# Patient Record
Sex: MALE | Race: WHITE | NOT HISPANIC OR LATINO
[De-identification: names, ages, dates, MRNs, and addresses within clinical notes are randomized per-mention and may not be internally consistent; named-entity substitution may affect disease eponyms.]

---

## 2017-06-29 ENCOUNTER — APPOINTMENT (OUTPATIENT)
Dept: SURGICAL ONCOLOGY | Facility: CLINIC | Age: 77
End: 2017-06-29

## 2017-06-29 DIAGNOSIS — Z86.73 PERSONAL HISTORY OF TRANSIENT ISCHEMIC ATTACK (TIA), AND CEREBRAL INFARCTION W/OUT RESIDUAL DEFICITS: ICD-10-CM

## 2017-06-29 DIAGNOSIS — Z78.9 OTHER SPECIFIED HEALTH STATUS: ICD-10-CM

## 2017-06-29 RX ORDER — CLOPIDOGREL BISULFATE 75 MG/1
75 TABLET, FILM COATED ORAL
Refills: 0 | Status: ACTIVE | COMMUNITY

## 2017-06-29 RX ORDER — ATORVASTATIN CALCIUM 40 MG/1
40 TABLET, FILM COATED ORAL
Refills: 0 | Status: ACTIVE | COMMUNITY

## 2017-06-29 RX ORDER — ESCITALOPRAM OXALATE 5 MG/1
5 TABLET ORAL
Refills: 0 | Status: ACTIVE | COMMUNITY

## 2017-06-29 RX ORDER — TAMSULOSIN HYDROCHLORIDE 0.4 MG/1
0.4 CAPSULE ORAL
Refills: 0 | Status: ACTIVE | COMMUNITY

## 2017-06-29 RX ORDER — FAMOTIDINE 20 MG/1
20 TABLET, FILM COATED ORAL
Refills: 0 | Status: ACTIVE | COMMUNITY

## 2017-06-29 RX ORDER — CHLORHEXIDINE GLUCONATE 4 %
325 (65 FE) LIQUID (ML) TOPICAL
Refills: 0 | Status: ACTIVE | COMMUNITY

## 2017-06-29 RX ORDER — FUROSEMIDE 20 MG/1
20 TABLET ORAL
Refills: 0 | Status: ACTIVE | COMMUNITY

## 2017-07-05 ENCOUNTER — RESULT REVIEW (OUTPATIENT)
Age: 77
End: 2017-07-05

## 2017-07-05 ENCOUNTER — OUTPATIENT (OUTPATIENT)
Dept: OUTPATIENT SERVICES | Facility: HOSPITAL | Age: 77
LOS: 1 days | End: 2017-07-05
Payer: MEDICARE

## 2017-07-05 DIAGNOSIS — C43.9 MALIGNANT MELANOMA OF SKIN, UNSPECIFIED: ICD-10-CM

## 2017-07-05 PROCEDURE — 88321 CONSLTJ&REPRT SLD PREP ELSWR: CPT

## 2017-07-13 ENCOUNTER — FORM ENCOUNTER (OUTPATIENT)
Age: 77
End: 2017-07-13

## 2017-07-14 ENCOUNTER — OUTPATIENT (OUTPATIENT)
Dept: OUTPATIENT SERVICES | Facility: HOSPITAL | Age: 77
LOS: 1 days | End: 2017-07-14
Payer: MEDICARE

## 2017-07-14 VITALS
HEART RATE: 59 BPM | RESPIRATION RATE: 16 BRPM | OXYGEN SATURATION: 99 % | SYSTOLIC BLOOD PRESSURE: 130 MMHG | HEIGHT: 68 IN | TEMPERATURE: 97 F | WEIGHT: 149.91 LBS | DIASTOLIC BLOOD PRESSURE: 80 MMHG

## 2017-07-14 DIAGNOSIS — Z85.820 PERSONAL HISTORY OF MALIGNANT MELANOMA OF SKIN: Chronic | ICD-10-CM

## 2017-07-14 DIAGNOSIS — I63.9 CEREBRAL INFARCTION, UNSPECIFIED: ICD-10-CM

## 2017-07-14 DIAGNOSIS — N20.0 CALCULUS OF KIDNEY: Chronic | ICD-10-CM

## 2017-07-14 DIAGNOSIS — T41.45XA ADVERSE EFFECT OF UNSPECIFIED ANESTHETIC, INITIAL ENCOUNTER: ICD-10-CM

## 2017-07-14 DIAGNOSIS — Z85.46 PERSONAL HISTORY OF MALIGNANT NEOPLASM OF PROSTATE: Chronic | ICD-10-CM

## 2017-07-14 DIAGNOSIS — Z98.890 OTHER SPECIFIED POSTPROCEDURAL STATES: Chronic | ICD-10-CM

## 2017-07-14 DIAGNOSIS — E11.9 TYPE 2 DIABETES MELLITUS WITHOUT COMPLICATIONS: ICD-10-CM

## 2017-07-14 DIAGNOSIS — Z90.5 ACQUIRED ABSENCE OF KIDNEY: Chronic | ICD-10-CM

## 2017-07-14 DIAGNOSIS — D03.59 MELANOMA IN SITU OF OTHER PART OF TRUNK: ICD-10-CM

## 2017-07-14 DIAGNOSIS — J98.9 RESPIRATORY DISORDER, UNSPECIFIED: ICD-10-CM

## 2017-07-14 DIAGNOSIS — Z95.9 PRESENCE OF CARDIAC AND VASCULAR IMPLANT AND GRAFT, UNSPECIFIED: ICD-10-CM

## 2017-07-14 DIAGNOSIS — C44.91 BASAL CELL CARCINOMA OF SKIN, UNSPECIFIED: Chronic | ICD-10-CM

## 2017-07-14 LAB
BLD GP AB SCN SERPL QL: NEGATIVE — SIGNIFICANT CHANGE UP
HBA1C BLD-MCNC: 5.8 % — HIGH (ref 4–5.6)
HCT VFR BLD CALC: 43.6 % — SIGNIFICANT CHANGE UP (ref 39–50)
HGB BLD-MCNC: 14.2 G/DL — SIGNIFICANT CHANGE UP (ref 13–17)
LDH SERPL L TO P-CCNC: 141 U/L — SIGNIFICANT CHANGE UP (ref 135–225)
MCHC RBC-ENTMCNC: 31.8 PG — SIGNIFICANT CHANGE UP (ref 27–34)
MCHC RBC-ENTMCNC: 32.6 % — SIGNIFICANT CHANGE UP (ref 32–36)
MCV RBC AUTO: 97.8 FL — SIGNIFICANT CHANGE UP (ref 80–100)
NRBC # FLD: 0 — SIGNIFICANT CHANGE UP
PLATELET # BLD AUTO: 212 K/UL — SIGNIFICANT CHANGE UP (ref 150–400)
PMV BLD: 10.2 FL — SIGNIFICANT CHANGE UP (ref 7–13)
RBC # BLD: 4.46 M/UL — SIGNIFICANT CHANGE UP (ref 4.2–5.8)
RBC # FLD: 12.2 % — SIGNIFICANT CHANGE UP (ref 10.3–14.5)
RH IG SCN BLD-IMP: POSITIVE — SIGNIFICANT CHANGE UP
WBC # BLD: 4.6 K/UL — SIGNIFICANT CHANGE UP (ref 3.8–10.5)
WBC # FLD AUTO: 4.6 K/UL — SIGNIFICANT CHANGE UP (ref 3.8–10.5)

## 2017-07-14 PROCEDURE — 71020: CPT | Mod: 26

## 2017-07-14 PROCEDURE — 93010 ELECTROCARDIOGRAM REPORT: CPT

## 2017-07-14 RX ORDER — SODIUM CHLORIDE 9 MG/ML
1000 INJECTION, SOLUTION INTRAVENOUS
Qty: 0 | Refills: 0 | Status: DISCONTINUED | OUTPATIENT
Start: 2017-08-01 | End: 2017-08-01

## 2017-07-14 NOTE — H&P PST ADULT - ANESTHESIA, PREVIOUS REACTION, PROFILE
As per wife in 2016 in [facility] - had to stay intubated 18 hrs after kidney stone surgery. Denies family Hx of malignant hyperthermia/respiratory complications

## 2017-07-14 NOTE — H&P PST ADULT - RS GEN PE MLT RESP DETAILS PC
respirations non-labored/clear to auscultation bilaterally/no rales/no rhonchi/no intercostal retractions/breath sounds equal/no wheezes/no chest wall tenderness/airway patent/good air movement/no subcutaneous emphysema

## 2017-07-14 NOTE — H&P PST ADULT - FAMILY HISTORY
Sibling  Still living? Yes, Estimated age: Age Unknown  Family history of melanoma, Age at diagnosis: Age Unknown  Family history of prostate cancer, Age at diagnosis: Age Unknown     Father  Still living? No  Family history of malignant melanoma, Age at diagnosis: Age Unknown

## 2017-07-14 NOTE — H&P PST ADULT - PROBLEM SELECTOR PLAN 5
Loop recorder placed in 2014. Wife states it was for tachycardia, it has resolved, but the device has stayed in place. Does not have a card. Discussed with Dr Orantes anesthesiologist. OR booking made aware.

## 2017-07-14 NOTE — H&P PST ADULT - PROBLEM SELECTOR PLAN 2
Etiology unknown as per wife. Uses oxygen at night. SALCEDO. Instructed to obtain pulmonary clearance.

## 2017-07-14 NOTE — H&P PST ADULT - PROBLEM SELECTOR PLAN 6
As per wife in 2016 in [facility] - had to stay intubated 18 hrs after kidney stone surgery. Discussed with Dr Orantes anesthesiology. Pending pulmonary clearance

## 2017-07-14 NOTE — H&P PST ADULT - PMH
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

## 2017-07-14 NOTE — H&P PST ADULT - NEGATIVE GENERAL SYMPTOMS
no anorexia/no polyuria/no weight gain/no fever/no malaise/no polydipsia/no chills/no polyphagia/no sweating

## 2017-07-14 NOTE — H&P PST ADULT - SOURCE OF INFORMATION, PROFILE
wife. Pt is a poor historian and hx of CVA and dementia/patient patient/wife. Pt is a poor historian and hx of CVA and is forgetful

## 2017-07-14 NOTE — H&P PST ADULT - PROBLEM SELECTOR PLAN 3
As per Dr Busch to continue plavix and aspirin, spoke to [place] surgical coordinator, patient and wife instructed. Pending cardiac clearance with last stress and echo. Has appt with cardiologist already set up.

## 2017-07-14 NOTE — H&P PST ADULT - FALL HARM RISK
coagulation(Bleeding disorder R/T clinical cond/anti-coags) coagulation(Bleeding disorder R/T clinical cond/anti-coags)/surgery/other/advanced age

## 2017-07-14 NOTE — H&P PST ADULT - PSH
Basal cell carcinoma  right forearm  H/O partial nephrectomy    H/O prostate cancer  s/p Cyberknife in [place]  History of loop recorder  11/2014  History of malignant melanoma  wide excision in 5/2013, Hx of multiple melanoma excisions  Kidney stone  2016

## 2017-07-14 NOTE — H&P PST ADULT - HISTORY OF PRESENT ILLNESS
76 year old male presents to presurgical testing with diagnosis of melanoma in situ, scheduled for wide excision of melanoma to left back for 8/1/17. Pt reports the lesion was diagnosed during routine examination. Pt is s/p wide excision of melanoma in situ in 5/2013.

## 2017-07-14 NOTE — H&P PST ADULT - ATTENDING COMMENTS
76 year old man with a melanoma in situ of his RODRICK back, scheduled for w.e. by me.    D/W Pt and wife in my office, and this morning.    All questions answered.    Consent on chart

## 2017-07-14 NOTE — H&P PST ADULT - NEGATIVE GENERAL GENITOURINARY SYMPTOMS
normal urinary frequency/no flank pain L/no flank pain R/no bladder infections/no urinary hesitancy/no hematuria/no incontinence/no renal colic no hematuria/no bladder infections/no renal colic/no flank pain R/normal urinary frequency/no flank pain L/no urinary hesitancy

## 2017-07-14 NOTE — H&P PST ADULT - NEUROLOGICAL COMMENTS
CVA multiple last in 2015, on plavix and aspirin. Hx of dementia, occasional forgetfullness CVA multiple last in 2015, on plavix and aspirin. Hx of dementia, occasional forgetfulness

## 2017-07-14 NOTE — H&P PST ADULT - NEGATIVE ENMT SYMPTOMS
no throat pain/no nasal congestion/no nasal discharge/no vertigo/no tinnitus/no ear pain/no nasal obstruction/no post-nasal discharge/no sinus symptoms/no dysphagia

## 2017-07-14 NOTE — H&P PST ADULT - NEGATIVE OPHTHALMOLOGIC SYMPTOMS
no discharge L/no loss of vision R/no blurred vision L/no diplopia/no loss of vision L/no photophobia/no blurred vision R/no pain R/no pain L/no discharge R

## 2017-07-14 NOTE — H&P PST ADULT - PROBLEM SELECTOR PLAN 1
Pt is scheduled for wide excision of melanoma to left back for 8/1/17. Preop instructions provided to patient and wife. Stated understanding. Will take own famotidine for GI prophylaxis. KOLBY precaution, OR booking notified

## 2017-07-14 NOTE — H&P PST ADULT - GASTROINTESTINAL DETAILS
no distention/no masses palpable/no organomegaly/soft/no rigidity/bowel sounds normal/no bruit/nontender/no guarding/no rebound tenderness

## 2017-07-14 NOTE — H&P PST ADULT - NEGATIVE CARDIOVASCULAR SYMPTOMS
no chest pain/no claudication/no peripheral edema/no orthopnea/no paroxysmal nocturnal dyspnea/no palpitations

## 2017-07-14 NOTE — H&P PST ADULT - MUSCULOSKELETAL
details… detailed exam decreased ROM/no calf tenderness/no joint warmth/no joint erythema/no joint swelling/diminished strength

## 2017-07-31 NOTE — ASU PATIENT PROFILE, ADULT - PSH
Basal cell carcinoma  right forearm  H/O partial nephrectomy    H/O prostate cancer  s/p Cyberknife in [place]  History of loop recorder  11/2014  History of malignant melanoma  wide excision in 5/2013, Hx of multiple melanoma excisions  Kidney stone  2016

## 2017-07-31 NOTE — ASU PATIENT PROFILE, ADULT - ANESTHESIA, PREVIOUS REACTION, PROFILE
As per wife in 2016 in [facility] - had to stay intubated 18 hrs after kidney stone surgery. Denies family Hx of malignant hyperthermia/respiratory complications

## 2017-08-01 ENCOUNTER — APPOINTMENT (OUTPATIENT)
Dept: SURGICAL ONCOLOGY | Facility: HOSPITAL | Age: 77
End: 2017-08-01

## 2017-08-01 ENCOUNTER — OUTPATIENT (OUTPATIENT)
Dept: OUTPATIENT SERVICES | Facility: HOSPITAL | Age: 77
LOS: 1 days | Discharge: HOME | End: 2017-08-01
Payer: MEDICARE

## 2017-08-01 ENCOUNTER — RESULT REVIEW (OUTPATIENT)
Age: 77
End: 2017-08-01

## 2017-08-01 VITALS
OXYGEN SATURATION: 94 % | RESPIRATION RATE: 15 BRPM | HEART RATE: 90 BPM | SYSTOLIC BLOOD PRESSURE: 128 MMHG | DIASTOLIC BLOOD PRESSURE: 69 MMHG | TEMPERATURE: 97 F

## 2017-08-01 VITALS
WEIGHT: 149.91 LBS | HEART RATE: 64 BPM | OXYGEN SATURATION: 96 % | HEIGHT: 68 IN | SYSTOLIC BLOOD PRESSURE: 141 MMHG | DIASTOLIC BLOOD PRESSURE: 74 MMHG | TEMPERATURE: 98 F | RESPIRATION RATE: 15 BRPM

## 2017-08-01 DIAGNOSIS — C44.91 BASAL CELL CARCINOMA OF SKIN, UNSPECIFIED: Chronic | ICD-10-CM

## 2017-08-01 DIAGNOSIS — Z85.820 PERSONAL HISTORY OF MALIGNANT MELANOMA OF SKIN: Chronic | ICD-10-CM

## 2017-08-01 DIAGNOSIS — D03.59 MELANOMA IN SITU OF OTHER PART OF TRUNK: ICD-10-CM

## 2017-08-01 DIAGNOSIS — Z98.890 OTHER SPECIFIED POSTPROCEDURAL STATES: Chronic | ICD-10-CM

## 2017-08-01 DIAGNOSIS — Z85.46 PERSONAL HISTORY OF MALIGNANT NEOPLASM OF PROSTATE: Chronic | ICD-10-CM

## 2017-08-01 DIAGNOSIS — Z90.5 ACQUIRED ABSENCE OF KIDNEY: Chronic | ICD-10-CM

## 2017-08-01 DIAGNOSIS — N20.0 CALCULUS OF KIDNEY: Chronic | ICD-10-CM

## 2017-08-01 LAB
GAS PNL BLDV: 140 MMOL/L — SIGNIFICANT CHANGE UP (ref 136–146)
GLUCOSE BLDV-MCNC: 92 — SIGNIFICANT CHANGE UP (ref 70–99)
POTASSIUM BLDV-SCNC: 3.9 MMOL/L — SIGNIFICANT CHANGE UP (ref 3.4–4.5)

## 2017-08-01 PROCEDURE — 88305 TISSUE EXAM BY PATHOLOGIST: CPT | Mod: 26

## 2017-08-01 PROCEDURE — 14301 TIS TRNFR ANY 30.1-60 SQ CM: CPT

## 2017-08-01 PROCEDURE — 11606 EXC TR-EXT MAL+MARG >4 CM: CPT | Mod: 59

## 2017-08-01 RX ORDER — ATORVASTATIN CALCIUM 80 MG/1
1 TABLET, FILM COATED ORAL
Qty: 0 | Refills: 0 | COMMUNITY

## 2017-08-01 RX ORDER — MEMANTINE HYDROCHLORIDE 10 MG/1
0 TABLET ORAL
Qty: 0 | Refills: 0 | COMMUNITY

## 2017-08-01 RX ORDER — FERROUS SULFATE 325(65) MG
1 TABLET ORAL
Qty: 0 | Refills: 0 | COMMUNITY

## 2017-08-01 RX ORDER — FOLIC ACID 0.8 MG
1 TABLET ORAL
Qty: 0 | Refills: 0 | COMMUNITY

## 2017-08-01 RX ORDER — ESCITALOPRAM OXALATE 10 MG/1
0 TABLET, FILM COATED ORAL
Qty: 0 | Refills: 0 | COMMUNITY

## 2017-08-01 RX ORDER — CLOPIDOGREL BISULFATE 75 MG/1
1 TABLET, FILM COATED ORAL
Qty: 0 | Refills: 0 | COMMUNITY

## 2017-08-01 RX ORDER — TAMSULOSIN HYDROCHLORIDE 0.4 MG/1
1 CAPSULE ORAL
Qty: 0 | Refills: 0 | COMMUNITY

## 2017-08-01 RX ORDER — DOCUSATE SODIUM 100 MG
1 CAPSULE ORAL
Qty: 0 | Refills: 0 | COMMUNITY

## 2017-08-01 RX ORDER — LANOLIN ALCOHOL/MO/W.PET/CERES
1 CREAM (GRAM) TOPICAL
Qty: 0 | Refills: 0 | COMMUNITY

## 2017-08-01 RX ORDER — FAMOTIDINE 10 MG/ML
0 INJECTION INTRAVENOUS
Qty: 0 | Refills: 0 | COMMUNITY

## 2017-08-01 RX ORDER — CARBIDOPA AND LEVODOPA 25; 100 MG/1; MG/1
0 TABLET ORAL
Qty: 0 | Refills: 0 | COMMUNITY

## 2017-08-01 RX ORDER — ASPIRIN/CALCIUM CARB/MAGNESIUM 324 MG
1 TABLET ORAL
Qty: 0 | Refills: 0 | COMMUNITY

## 2017-08-01 RX ORDER — CHOLECALCIFEROL (VITAMIN D3) 125 MCG
1 CAPSULE ORAL
Qty: 0 | Refills: 0 | COMMUNITY

## 2017-08-01 RX ORDER — FUROSEMIDE 40 MG
1 TABLET ORAL
Qty: 0 | Refills: 0 | COMMUNITY

## 2017-08-01 NOTE — PROGRESS NOTE ADULT - ASSESSMENT
A/P:  76M PMHx prior melanoma s/p wide local excision who presents with melanoma in situ for wide local excision of the lesion on his L upper back. Plan to proceed to with procedure in operating room.  -wide local excision (>2cm margins) today  -anticipate disposition home afterward. A/P:  76M PMHx prior melanoma s/p wide local excision who presents with melanoma in situ for wide local excision of the lesion on his L upper back. Plan to proceed to with procedure in operating room.  -wide local excision (>2cm margins) today in operating theater.  -anticipate disposition home afterward. A/P:  76M PMHx prior melanoma s/p wide local excision who presents with melanoma in situ for wide local excision of the lesion on his L upper back. Plan to proceed to with procedure in operating room.  -wide local excision (>2cm margins) today OR  -anticipate disposition home afterward.

## 2017-08-01 NOTE — PROGRESS NOTE ADULT - SUBJECTIVE AND OBJECTIVE BOX
Surgery Pre-operative Note  8/1/17    76M PMHx melanoma s/p wide excision, HTN, CVA, DM, HLD, prostate cancer s/p resection, renal cell carcinoma who presents with melanoma in situ on pathology of a biopsy from early July from his L upper back. He presents for elective wide local excision of the lesion.    VS:    PE:        Labs/pathology:  7/5/17 biopsy  Final Diagnosis  Skin, left upper back, shave biopsy  - Melanoma in situ, at least, with changes of regression.  See note.    Note: Sections demonstrate at least melanoma in situ because  there are regressive changes, suggesting a possible  previous invasive component, which is not seen in the current  biopsy.    A/P:  76M PMHx prior melanoma s/p wide local excision who presents with melanoma in situ for wide local excision of the lesion on his L upper back. Surgery Pre-operative Note  8/1/17    76M PMHx melanoma s/p wide excision, HTN, CVA, DM, HLD, prostate cancer s/p resection, renal cell carcinoma who presents with melanoma in situ on pathology of a biopsy from early July from his L upper back. He presents for elective wide local excision of the lesion.    Home Rx:   Patient Currently Takes Medications as of 14-Jul-2017 14:49 documented in Prescription Writer  · 	ferrous sulfate 325 mg (65 mg elemental iron) oral delayed release tablet: 1 tab(s) orally once a day  · 	carbidopa-levodopa 25 mg-100 mg oral tablet, extended release:  orally 2 times a day 8am and 2pm  · 	tamsulosin 0.4 mg oral capsule: 1 cap(s) orally once a day8pm  · 	atorvastatin 40 mg oral tablet: 1 tab(s) orally once a day 8pm  · 	escitalopram 5 mg oral tablet:  orally once a day 8am  · 	melatonin 5 mg oral capsule: 1 cap(s) orally once a day (at bedtime)  	last dose 8/1/17  · 	docusate sodium 100 mg oral tablet: 1 tab(s) orally 2 times a day  · 	folic acid 1 mg oral tablet: 1 tab(s) orally once a day  · 	aspirin 81 mg oral tablet: 1 tab(s) orally once a day  · 	furosemide 20 mg oral tablet: 1 tab(s) orally once a day  · 	clopidogrel 75 mg oral tablet: 1 tab(s) orally once a day in am  · 	Vitamin D3 2000 intl units oral tablet: 1 tab(s) orally once a day  · 	famotidine 20 mg oral tablet:  orally once a day  · 	memantine 10 mg oral tablet:  orally once a day      VS:      PE:        Labs/pathology:  7/5/17 biopsy  Final Diagnosis  Skin, left upper back, shave biopsy  - Melanoma in situ, at least, with changes of regression.  See note.    Note: Sections demonstrate at least melanoma in situ because  there are regressive changes, suggesting a possible  previous invasive component, which is not seen in the current  biopsy.    A/P:  76M PMHx prior melanoma s/p wide local excision who presents with melanoma in situ for wide local excision of the lesion on his L upper back. Surgery Pre-operative Note  8/1/17    76M PMHx dementia, melanoma s/p wide excision, HTN, CVA, DM, HLD, prostate cancer s/p resection, renal cell carcinoma who presents with melanoma in situ on pathology of a biopsy from early July from his L upper back. He presents for elective wide local excision of the on his back.      VS:  Vital Signs Last 24 Hrs  T(C): 36.4 (01 Aug 2017 09:26), Max: 36.4 (01 Aug 2017 09:26)  T(F): 97.6 (01 Aug 2017 09:26), Max: 97.6 (01 Aug 2017 09:26)  HR: 64 (01 Aug 2017 09:26) (64 - 64)  BP: 141/74 (01 Aug 2017 09:26) (141/74 - 141/74)  BP(mean): --  RR: 15 (01 Aug 2017 09:26) (15 - 15)  SpO2: 96% (01 Aug 2017 09:26) (96% - 96%)    PE:  Gen: elderly, NAD, sitting in chair; wife at side  Pulm: breathing comfortably on RA  Cor: regular  Abd: soft, NT, ND  Back: skin w/ multiple pigmented lesions      Home Rx:   Patient Currently Takes Medications as of 14-Jul-2017 14:49 documented in Prescription Writer  · 	ferrous sulfate 325 mg (65 mg elemental iron) oral delayed release tablet: 1 tab(s) orally once a day  · 	carbidopa-levodopa 25 mg-100 mg oral tablet, extended release:  orally 2 times a day 8am and 2pm  · 	tamsulosin 0.4 mg oral capsule: 1 cap(s) orally once a day8pm  · 	atorvastatin 40 mg oral tablet: 1 tab(s) orally once a day 8pm  · 	escitalopram 5 mg oral tablet:  orally once a day 8am  · 	melatonin 5 mg oral capsule: 1 cap(s) orally once a day (at bedtime)  	last dose 8/1/17  · 	docusate sodium 100 mg oral tablet: 1 tab(s) orally 2 times a day  · 	folic acid 1 mg oral tablet: 1 tab(s) orally once a day  · 	aspirin 81 mg oral tablet: 1 tab(s) orally once a day  · 	furosemide 20 mg oral tablet: 1 tab(s) orally once a day  · 	clopidogrel 75 mg oral tablet: 1 tab(s) orally once a day in am  · 	Vitamin D3 2000 intl units oral tablet: 1 tab(s) orally once a day  · 	famotidine 20 mg oral tablet:  orally once a day  · 	memantine 10 mg oral tablet:  orally once a day        Labs/pathology:  7/5/17 biopsy  Final Diagnosis  Skin, left upper back, shave biopsy  - Melanoma in situ, at least, with changes of regression.  See note.    Note: Sections demonstrate at least melanoma in situ because  there are regressive changes, suggesting a possible  previous invasive component, which is not seen in the current  biopsy. Surgery Pre-operative Note  8/1/17      76M PMHx dementia, melanoma s/p wide excision (2013), HTN, CVA, DM, HLD, prostate cancer s/p resection, renal cell carcinoma who presents with melanoma in situ on pathology of a biopsy from early July from his L upper back. He presents for elective wide local excision of the on his back.      VS:  Vital Signs Last 24 Hrs  T(C): 36.4 (01 Aug 2017 09:26), Max: 36.4 (01 Aug 2017 09:26)  T(F): 97.6 (01 Aug 2017 09:26), Max: 97.6 (01 Aug 2017 09:26)  HR: 64 (01 Aug 2017 09:26) (64 - 64)  BP: 141/74 (01 Aug 2017 09:26) (141/74 - 141/74)  BP(mean): --  RR: 15 (01 Aug 2017 09:26) (15 - 15)  SpO2: 96% (01 Aug 2017 09:26) (96% - 96%)      PE:  Gen: elderly, NAD, sitting in chair; wife at side  Pulm: breathing comfortably on RA  Cor: regular  Abd: soft, NT, ND  Skin: multiple pigmented lesions      Home Rx:   Patient Currently Takes Medications as of 14-Jul-2017 14:49 documented in Prescription Writer  · 	ferrous sulfate 325 mg (65 mg elemental iron) oral delayed release tablet: 1 tab(s) orally once a day  · 	carbidopa-levodopa 25 mg-100 mg oral tablet, extended release:  orally 2 times a day 8am and 2pm  · 	tamsulosin 0.4 mg oral capsule: 1 cap(s) orally once a day8pm  · 	atorvastatin 40 mg oral tablet: 1 tab(s) orally once a day 8pm  · 	escitalopram 5 mg oral tablet:  orally once a day 8am  · 	melatonin 5 mg oral capsule: 1 cap(s) orally once a day (at bedtime)  	last dose 8/1/17  · 	docusate sodium 100 mg oral tablet: 1 tab(s) orally 2 times a day  · 	folic acid 1 mg oral tablet: 1 tab(s) orally once a day  · 	aspirin 81 mg oral tablet: 1 tab(s) orally once a day  · 	furosemide 20 mg oral tablet: 1 tab(s) orally once a day  · 	clopidogrel 75 mg oral tablet: 1 tab(s) orally once a day in am  · 	Vitamin D3 2000 intl units oral tablet: 1 tab(s) orally once a day  · 	famotidine 20 mg oral tablet:  orally once a day  · 	memantine 10 mg oral tablet:  orally once a day        Labs/pathology:  CBC (7/14/17): 4.6>14.2/43.6<212      7/5/17 biopsy  Final Diagnosis  Skin, left upper back, shave biopsy  - Melanoma in situ, at least, with changes of regression.  See note.    Note: Sections demonstrate at least melanoma in situ because  there are regressive changes, suggesting a possible  previous invasive component, which is not seen in the current  biopsy.

## 2017-08-01 NOTE — ASU DISCHARGE PLAN (ADULT/PEDIATRIC). - NOTIFY
Unable to Urinate/Bleeding that does not stop/Persistent Nausea and Vomiting/Numbness, tingling/Increased Irritability or Sluggishness/Fever greater than 101/Numbness, color, or temperature change to extremity/Excessive Diarrhea/Inability to Tolerate Liquids or Foods/Swelling that continues/Pain not relieved by Medications

## 2017-08-03 ENCOUNTER — TRANSCRIPTION ENCOUNTER (OUTPATIENT)
Age: 77
End: 2017-08-03

## 2017-08-04 ENCOUNTER — EMERGENCY (EMERGENCY)
Facility: HOSPITAL | Age: 77
LOS: 1 days | Discharge: HOME | End: 2017-08-04
Attending: EMERGENCY MEDICINE | Admitting: EMERGENCY MEDICINE
Payer: MEDICARE

## 2017-08-04 VITALS
TEMPERATURE: 97 F | WEIGHT: 149.91 LBS | SYSTOLIC BLOOD PRESSURE: 159 MMHG | OXYGEN SATURATION: 95 % | RESPIRATION RATE: 18 BRPM | DIASTOLIC BLOOD PRESSURE: 96 MMHG | HEART RATE: 69 BPM

## 2017-08-04 DIAGNOSIS — Z85.46 PERSONAL HISTORY OF MALIGNANT NEOPLASM OF PROSTATE: Chronic | ICD-10-CM

## 2017-08-04 DIAGNOSIS — N20.0 CALCULUS OF KIDNEY: Chronic | ICD-10-CM

## 2017-08-04 DIAGNOSIS — Z90.5 ACQUIRED ABSENCE OF KIDNEY: Chronic | ICD-10-CM

## 2017-08-04 DIAGNOSIS — Z85.820 PERSONAL HISTORY OF MALIGNANT MELANOMA OF SKIN: Chronic | ICD-10-CM

## 2017-08-04 DIAGNOSIS — Z98.890 OTHER SPECIFIED POSTPROCEDURAL STATES: Chronic | ICD-10-CM

## 2017-08-04 DIAGNOSIS — C44.91 BASAL CELL CARCINOMA OF SKIN, UNSPECIFIED: Chronic | ICD-10-CM

## 2017-08-04 PROCEDURE — G0463: CPT

## 2017-08-04 NOTE — ED PROVIDER NOTE - PSH
Basal cell carcinoma  right forearm  H/O partial nephrectomy    H/O prostate cancer  s/p Cyberknife in [place]  History of loop recorder  11/2014  History of malignant melanoma  wide excision in 5/2013, Hx of multiple melanoma excisions  Kidney stone  2016

## 2017-08-04 NOTE — ED ADULT NURSE NOTE - OBJECTIVE STATEMENT
76 y.o male presenting to ED accompanied by his wife s/p melanoma removal on back 3 days ago c/o clear drainage from the wound site. wife was concerned that area where melanoma was removed was draining clear fluid starting today. no pus, redness, swelling or warmth present. pt denies fever, chills, weakness. VS stable, no other complaints of pain or discomfort. safety precautions maintained.

## 2017-08-04 NOTE — ED PROVIDER NOTE - ATTENDING CONTRIBUTION TO CARE
pt is a 77 y/o male with h/o cva, parkinsons, dementia s/p melonoma resection to upper back 5-6cm for clear drainage with steri strips in place, no signs of infection. no f/c. small seroma to wound edge but no drainge, called dr yoon the surgeon who sent him in left message.

## 2017-08-04 NOTE — ED PROVIDER NOTE - OBJECTIVE STATEMENT
76 year old male presents for wound check. Patient had procedure to remove melanoma from his back 3 days ago performed by Dr. Busch. Patients wife was concerned that because there was clear fluid draining from site. Patient denies pain to the area, purulent drainage, bloody drainage, fevers, chills.

## 2017-08-04 NOTE — ED ADULT NURSE NOTE - PSH
Basal cell carcinoma  right forearm  H/O partial nephrectomy    H/O prostate cancer  s/p Cyberknife in [place]  History of loop recorder  11/2014  History of malignant melanoma  wide excision in 5/2013, Hx of multiple melanoma excisions  Kidney stone  2016

## 2017-08-04 NOTE — ED ADULT NURSE NOTE - DISCHARGE TEACHING
pt. d/c by ED PA. will return if s.s worsen or do not resolve. will return for any new or worsening redness, fever body aches.

## 2017-08-04 NOTE — ED PROVIDER NOTE - CARE PLAN
Principal Discharge DX:	Wound of back  Instructions for follow-up, activity and diet:	1. Follow up with Dr. Busch as scheduled  2. Continue to take all medications as previously prescribed   3. Return to ED for change of symptoms including pain to wound site, pus draining from site, fevers and any other symptoms of concern

## 2017-08-04 NOTE — ED PROVIDER NOTE - PLAN OF CARE
1. Follow up with Dr. Busch as scheduled  2. Continue to take all medications as previously prescribed   3. Return to ED for change of symptoms including pain to wound site, pus draining from site, fevers and any other symptoms of concern

## 2017-08-07 LAB — SURGICAL PATHOLOGY STUDY: SIGNIFICANT CHANGE UP

## 2017-09-07 ENCOUNTER — APPOINTMENT (OUTPATIENT)
Dept: SURGICAL ONCOLOGY | Facility: CLINIC | Age: 77
End: 2017-09-07
Payer: MEDICARE

## 2017-09-07 VITALS
SYSTOLIC BLOOD PRESSURE: 128 MMHG | WEIGHT: 150 LBS | DIASTOLIC BLOOD PRESSURE: 80 MMHG | BODY MASS INDEX: 22.22 KG/M2 | HEIGHT: 69 IN | RESPIRATION RATE: 15 BRPM

## 2017-09-07 DIAGNOSIS — D03.59 MELANOMA IN SITU OF OTHER PART OF TRUNK: ICD-10-CM

## 2017-09-07 PROCEDURE — 99024 POSTOP FOLLOW-UP VISIT: CPT

## 2017-09-07 RX ORDER — PANTOPRAZOLE 40 MG/1
40 TABLET, DELAYED RELEASE ORAL
Qty: 30 | Refills: 0 | Status: ACTIVE | COMMUNITY
Start: 2017-06-28

## 2017-09-07 RX ORDER — CYCLOSPORINE 0.5 MG/ML
0.05 EMULSION OPHTHALMIC
Qty: 180 | Refills: 0 | Status: ACTIVE | COMMUNITY
Start: 2016-09-21

## 2017-09-07 RX ORDER — CARBIDOPA AND LEVODOPA 25; 100 MG/1; MG/1
25-100 TABLET ORAL
Qty: 135 | Refills: 0 | Status: ACTIVE | COMMUNITY
Start: 2017-07-20

## 2018-01-23 ENCOUNTER — APPOINTMENT (OUTPATIENT)
Dept: OPHTHALMOLOGY | Facility: CLINIC | Age: 78
End: 2018-01-23
Payer: MEDICARE

## 2018-01-23 DIAGNOSIS — H49.33: ICD-10-CM

## 2018-01-23 PROCEDURE — 99204 OFFICE O/P NEW MOD 45 MIN: CPT

## 2018-01-23 PROCEDURE — 92083 EXTENDED VISUAL FIELD XM: CPT

## 2018-01-23 RX ORDER — DOCUSATE SODIUM 100 MG
100 TABLET ORAL
Refills: 0 | Status: COMPLETED | COMMUNITY
Start: 1900-01-01 | End: 2018-01-23

## 2018-01-23 RX ORDER — CHLORHEXIDINE GLUCONATE 4 %
5 LIQUID (ML) TOPICAL
Refills: 0 | Status: COMPLETED | COMMUNITY
Start: 1900-01-01 | End: 2018-01-23

## 2018-01-23 RX ORDER — MEMANTINE HYDROCHLORIDE 10 MG/1
10 TABLET, FILM COATED ORAL
Refills: 0 | Status: COMPLETED | COMMUNITY
Start: 1900-01-01 | End: 2018-01-23

## 2018-07-16 PROBLEM — J98.9 RESPIRATORY DISORDER, UNSPECIFIED: Chronic | Status: ACTIVE | Noted: 2017-07-14

## 2018-07-25 PROBLEM — Z78.9 ALCOHOL USE: Status: ACTIVE | Noted: 2017-06-29

## 2019-05-14 ENCOUNTER — APPOINTMENT (OUTPATIENT)
Dept: RADIOLOGY | Facility: HOSPITAL | Age: 79
End: 2019-05-14

## 2019-05-14 ENCOUNTER — OUTPATIENT (OUTPATIENT)
Dept: OUTPATIENT SERVICES | Facility: HOSPITAL | Age: 79
LOS: 1 days | Discharge: HOME | End: 2019-05-14
Payer: MEDICARE

## 2019-05-14 DIAGNOSIS — Z85.46 PERSONAL HISTORY OF MALIGNANT NEOPLASM OF PROSTATE: Chronic | ICD-10-CM

## 2019-05-14 DIAGNOSIS — R13.12 DYSPHAGIA, OROPHARYNGEAL PHASE: ICD-10-CM

## 2019-05-14 DIAGNOSIS — C44.91 BASAL CELL CARCINOMA OF SKIN, UNSPECIFIED: Chronic | ICD-10-CM

## 2019-05-14 DIAGNOSIS — Z90.5 ACQUIRED ABSENCE OF KIDNEY: Chronic | ICD-10-CM

## 2019-05-14 DIAGNOSIS — N20.0 CALCULUS OF KIDNEY: Chronic | ICD-10-CM

## 2019-05-14 DIAGNOSIS — Z85.820 PERSONAL HISTORY OF MALIGNANT MELANOMA OF SKIN: Chronic | ICD-10-CM

## 2019-05-14 DIAGNOSIS — Z98.890 OTHER SPECIFIED POSTPROCEDURAL STATES: Chronic | ICD-10-CM

## 2019-05-14 PROBLEM — C64.9 MALIGNANT NEOPLASM OF UNSPECIFIED KIDNEY, EXCEPT RENAL PELVIS: Chronic | Status: ACTIVE | Noted: 2017-07-14

## 2019-05-14 PROBLEM — D64.9 ANEMIA, UNSPECIFIED: Chronic | Status: ACTIVE | Noted: 2017-07-14

## 2019-05-14 PROBLEM — I10 ESSENTIAL (PRIMARY) HYPERTENSION: Chronic | Status: ACTIVE | Noted: 2017-07-14

## 2019-05-14 PROBLEM — N40.0 BENIGN PROSTATIC HYPERPLASIA WITHOUT LOWER URINARY TRACT SYMPTOMS: Chronic | Status: ACTIVE | Noted: 2017-07-14

## 2019-05-14 PROBLEM — D03.59 MELANOMA IN SITU OF OTHER PART OF TRUNK: Chronic | Status: ACTIVE | Noted: 2017-07-14

## 2019-05-14 PROBLEM — I63.9 CEREBRAL INFARCTION, UNSPECIFIED: Chronic | Status: ACTIVE | Noted: 2017-07-14

## 2019-05-14 PROBLEM — F03.90 UNSPECIFIED DEMENTIA WITHOUT BEHAVIORAL DISTURBANCE: Chronic | Status: ACTIVE | Noted: 2017-07-14

## 2019-05-14 PROBLEM — E78.5 HYPERLIPIDEMIA, UNSPECIFIED: Chronic | Status: ACTIVE | Noted: 2017-07-14

## 2019-05-14 PROBLEM — C61 MALIGNANT NEOPLASM OF PROSTATE: Chronic | Status: ACTIVE | Noted: 2017-07-14

## 2019-05-14 PROBLEM — E11.9 TYPE 2 DIABETES MELLITUS WITHOUT COMPLICATIONS: Chronic | Status: ACTIVE | Noted: 2017-07-14

## 2019-05-14 PROBLEM — F32.9 MAJOR DEPRESSIVE DISORDER, SINGLE EPISODE, UNSPECIFIED: Chronic | Status: ACTIVE | Noted: 2017-07-14

## 2019-05-14 PROCEDURE — 70371 SPEECH EVALUATION COMPLEX: CPT | Mod: 26

## 2020-01-28 NOTE — ASU PATIENT PROFILE, ADULT - NSALCOHOLPROBLEMSRELYN_GEN_A_CORE_SD
I discussed the CT and labs with him.   
Patient called looking for CT results.  Results are in the chart.  
no

## 2020-08-19 NOTE — ASU PATIENT PROFILE, ADULT - VISION (WITH CORRECTIVE LENSES IF THE PATIENT USUALLY WEARS THEM):
individual instruction/written material/verbal instruction
Normal vision: sees adequately in most situations; can see medication labels, newsprint

## 2025-01-29 NOTE — H&P PST ADULT - ACTIVITY
Comment: +hx of AD since childhood, well managed w regular moisturizer use.
Detail Level: Detailed
Render Risk Assessment In Note?: no
Comment: HS 1. Reports hx of boils in armpits and buttock, most recent flare on gluteal cleft is now resolved s/p I&D done at urgent care in ~Nov of 24. No FMH of boils. Currently using chlorhexidine as a body wash. Discussed role of tobacco use, weight gain and carb rich diet in exacerbating the condition. Recc’d he also consult his surgeon to discuss excision of pilonidal cyst on lower back/gluteal cleft and advised that he initiates clindamycin 1 % topical gel & cont w chlorhexidine to prevent flares. ILK in reserve if he develops any painful lesions. Discussed oral abx and biologics but pt defers given infrequent flares (~once yearly). Has improved since losing 40 lbs of weight. Fu PRN.
mostly uses walker and wheelchair for longer distances, 2 person assit